# Patient Record
Sex: MALE | Race: WHITE | NOT HISPANIC OR LATINO | URBAN - METROPOLITAN AREA
[De-identification: names, ages, dates, MRNs, and addresses within clinical notes are randomized per-mention and may not be internally consistent; named-entity substitution may affect disease eponyms.]

---

## 2024-04-24 ENCOUNTER — OFFICE VISIT (OUTPATIENT)
Dept: PRIMARY CARE | Facility: CLINIC | Age: 49
End: 2024-04-24
Payer: COMMERCIAL

## 2024-04-24 ENCOUNTER — LAB (OUTPATIENT)
Dept: LAB | Facility: LAB | Age: 49
End: 2024-04-24
Payer: COMMERCIAL

## 2024-04-24 VITALS
WEIGHT: 252 LBS | HEIGHT: 71 IN | BODY MASS INDEX: 35.28 KG/M2 | OXYGEN SATURATION: 98 % | HEART RATE: 92 BPM | SYSTOLIC BLOOD PRESSURE: 128 MMHG | DIASTOLIC BLOOD PRESSURE: 82 MMHG

## 2024-04-24 DIAGNOSIS — Z00.00 HEALTHCARE MAINTENANCE: Primary | ICD-10-CM

## 2024-04-24 DIAGNOSIS — E66.9 OBESITY (BMI 30-39.9): ICD-10-CM

## 2024-04-24 DIAGNOSIS — G89.29 CHRONIC BILATERAL LOW BACK PAIN WITHOUT SCIATICA: ICD-10-CM

## 2024-04-24 DIAGNOSIS — Z00.00 HEALTHCARE MAINTENANCE: ICD-10-CM

## 2024-04-24 DIAGNOSIS — M54.50 CHRONIC BILATERAL LOW BACK PAIN WITHOUT SCIATICA: ICD-10-CM

## 2024-04-24 DIAGNOSIS — Z23 ENCOUNTER FOR IMMUNIZATION: ICD-10-CM

## 2024-04-24 DIAGNOSIS — Z23 NEED FOR TDAP VACCINATION: ICD-10-CM

## 2024-04-24 LAB
ALBUMIN SERPL BCP-MCNC: 4.3 G/DL (ref 3.4–5)
ALP SERPL-CCNC: 53 U/L (ref 33–120)
ALT SERPL W P-5'-P-CCNC: 27 U/L (ref 10–52)
ANION GAP SERPL CALC-SCNC: 11 MMOL/L (ref 10–20)
AST SERPL W P-5'-P-CCNC: 25 U/L (ref 9–39)
BASOPHILS # BLD AUTO: 0.05 X10*3/UL (ref 0–0.1)
BASOPHILS NFR BLD AUTO: 1 %
BILIRUB SERPL-MCNC: 0.5 MG/DL (ref 0–1.2)
BUN SERPL-MCNC: 25 MG/DL (ref 6–23)
CALCIUM SERPL-MCNC: 9.4 MG/DL (ref 8.6–10.3)
CHLORIDE SERPL-SCNC: 106 MMOL/L (ref 98–107)
CHOLEST SERPL-MCNC: 216 MG/DL (ref 0–199)
CHOLESTEROL/HDL RATIO: 3.3
CO2 SERPL-SCNC: 25 MMOL/L (ref 21–32)
CREAT SERPL-MCNC: 1 MG/DL (ref 0.5–1.3)
EGFRCR SERPLBLD CKD-EPI 2021: >90 ML/MIN/1.73M*2
EOSINOPHIL # BLD AUTO: 0.15 X10*3/UL (ref 0–0.7)
EOSINOPHIL NFR BLD AUTO: 3 %
ERYTHROCYTE [DISTWIDTH] IN BLOOD BY AUTOMATED COUNT: 13.8 % (ref 11.5–14.5)
GLUCOSE SERPL-MCNC: 95 MG/DL (ref 74–99)
HCT VFR BLD AUTO: 44.6 % (ref 41–52)
HDLC SERPL-MCNC: 65 MG/DL
HGB BLD-MCNC: 13.8 G/DL (ref 13.5–17.5)
IMM GRANULOCYTES # BLD AUTO: 0.01 X10*3/UL (ref 0–0.7)
IMM GRANULOCYTES NFR BLD AUTO: 0.2 % (ref 0–0.9)
LDLC SERPL CALC-MCNC: 123 MG/DL
LYMPHOCYTES # BLD AUTO: 1.71 X10*3/UL (ref 1.2–4.8)
LYMPHOCYTES NFR BLD AUTO: 34.6 %
MCH RBC QN AUTO: 27.2 PG (ref 26–34)
MCHC RBC AUTO-ENTMCNC: 30.9 G/DL (ref 32–36)
MCV RBC AUTO: 88 FL (ref 80–100)
MONOCYTES # BLD AUTO: 0.49 X10*3/UL (ref 0.1–1)
MONOCYTES NFR BLD AUTO: 9.9 %
NEUTROPHILS # BLD AUTO: 2.53 X10*3/UL (ref 1.2–7.7)
NEUTROPHILS NFR BLD AUTO: 51.3 %
NON HDL CHOLESTEROL: 151 MG/DL (ref 0–149)
NRBC BLD-RTO: 0 /100 WBCS (ref 0–0)
PLATELET # BLD AUTO: 250 X10*3/UL (ref 150–450)
POC APPEARANCE, URINE: CLEAR
POC BILIRUBIN, URINE: NEGATIVE
POC BLOOD, URINE: NEGATIVE
POC COLOR, URINE: NORMAL
POC GLUCOSE, URINE: NEGATIVE MG/DL
POC KETONES, URINE: NEGATIVE MG/DL
POC LEUKOCYTES, URINE: NEGATIVE
POC NITRITE,URINE: NEGATIVE
POC PH, URINE: 6 PH
POC PROTEIN, URINE: NEGATIVE MG/DL
POC SPECIFIC GRAVITY, URINE: 1.02
POC UROBILINOGEN, URINE: 0.2 EU/DL
POTASSIUM SERPL-SCNC: 4.4 MMOL/L (ref 3.5–5.3)
PROT SERPL-MCNC: 7 G/DL (ref 6.4–8.2)
PSA SERPL-MCNC: 0.31 NG/ML
RBC # BLD AUTO: 5.08 X10*6/UL (ref 4.5–5.9)
SODIUM SERPL-SCNC: 138 MMOL/L (ref 136–145)
TRIGL SERPL-MCNC: 142 MG/DL (ref 0–149)
TSH SERPL-ACNC: 2.43 MIU/L (ref 0.44–3.98)
VLDL: 28 MG/DL (ref 0–40)
WBC # BLD AUTO: 4.9 X10*3/UL (ref 4.4–11.3)

## 2024-04-24 PROCEDURE — 80053 COMPREHEN METABOLIC PANEL: CPT

## 2024-04-24 PROCEDURE — 85025 COMPLETE CBC W/AUTO DIFF WBC: CPT

## 2024-04-24 PROCEDURE — 84443 ASSAY THYROID STIM HORMONE: CPT

## 2024-04-24 PROCEDURE — 99203 OFFICE O/P NEW LOW 30 MIN: CPT | Performed by: STUDENT IN AN ORGANIZED HEALTH CARE EDUCATION/TRAINING PROGRAM

## 2024-04-24 PROCEDURE — 90715 TDAP VACCINE 7 YRS/> IM: CPT | Performed by: STUDENT IN AN ORGANIZED HEALTH CARE EDUCATION/TRAINING PROGRAM

## 2024-04-24 PROCEDURE — 83036 HEMOGLOBIN GLYCOSYLATED A1C: CPT

## 2024-04-24 PROCEDURE — 36415 COLL VENOUS BLD VENIPUNCTURE: CPT

## 2024-04-24 PROCEDURE — 90471 IMMUNIZATION ADMIN: CPT | Performed by: STUDENT IN AN ORGANIZED HEALTH CARE EDUCATION/TRAINING PROGRAM

## 2024-04-24 PROCEDURE — 99386 PREV VISIT NEW AGE 40-64: CPT | Performed by: STUDENT IN AN ORGANIZED HEALTH CARE EDUCATION/TRAINING PROGRAM

## 2024-04-24 PROCEDURE — 1036F TOBACCO NON-USER: CPT | Performed by: STUDENT IN AN ORGANIZED HEALTH CARE EDUCATION/TRAINING PROGRAM

## 2024-04-24 PROCEDURE — 80061 LIPID PANEL: CPT

## 2024-04-24 PROCEDURE — 84153 ASSAY OF PSA TOTAL: CPT

## 2024-04-24 PROCEDURE — 93000 ELECTROCARDIOGRAM COMPLETE: CPT | Performed by: STUDENT IN AN ORGANIZED HEALTH CARE EDUCATION/TRAINING PROGRAM

## 2024-04-24 PROCEDURE — 81002 URINALYSIS NONAUTO W/O SCOPE: CPT | Performed by: STUDENT IN AN ORGANIZED HEALTH CARE EDUCATION/TRAINING PROGRAM

## 2024-04-24 ASSESSMENT — PATIENT HEALTH QUESTIONNAIRE - PHQ9
2. FEELING DOWN, DEPRESSED OR HOPELESS: NOT AT ALL
SUM OF ALL RESPONSES TO PHQ9 QUESTIONS 1 AND 2: 0
1. LITTLE INTEREST OR PLEASURE IN DOING THINGS: NOT AT ALL

## 2024-04-24 NOTE — PROGRESS NOTES
Subjective   Patient ID: Tc Landa is a 48 y.o. male who presents for Annual Exam.  Today he is accompanied by alone.     HPI  1. Health maintenance  Overall patient is doing well.  Denies any chest pain, shortness of breath or wheezing upon exertion.  Denies any nausea/vomiting or diarrhea/constipation.  Denies any urinary symptoms.  Denies any recent changes in vision or hearing.  Denies any numbness or tingling.  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 Chronic back pain  Has a chronic past medical history of back pain in which he is following up with a pain management specialist  Does radiotherapy ablation of the nerves in the lumbar area every 12-15 months with significant improvement  Overall feels stable and continues to follow-up with a specialist  Also uses advil on an as-needed basis    3.  Need for Tdap vaccine  Patient is willing to receive his Tdap vaccine today    4. Obesity  Patient admits to struggling with weight loss although he has increased level of physical activity alongside counting calorie intake.  He is willing to try GLP-1 medication for weight loss      No current outpatient medications on file prior to visit.     No current facility-administered medications on file prior to visit.        No Known Allergies    Immunization History   Administered Date(s) Administered    Pfizer Purple Cap SARS-CoV-2 04/14/2021, 05/05/2021    Rabies, Unspecified 10/14/2011, 10/17/2011         Review of Systems  All pertinent positive  "symptoms are included in the history of present illness.  All other systems have been reviewed and are negative and noncontributory to this patient's current ailments.     Objective   /82 (BP Location: Left arm, Patient Position: Sitting, BP Cuff Size: Large adult)   Pulse 92   Ht 1.803 m (5' 11\")   Wt 114 kg (252 lb)   SpO2 98%   BMI 35.15 kg/m²   BSA: 2.39 meters squared      Physical Exam  CONSTITUTIONAL - well nourished, well developed, looks like stated age, in no acute distress  SKIN - normal skin color and pigmentation, normal skin turgor without rash, lesions, or nodules visualized  HEAD - no trauma, normocephalic  EYES - pupils are equal and reactive to light, extraocular muscles are intact, and normal external exam  ENT - uvula midline, normal tongue movement, throat without erythema or exudate, nasal passage without discharge and patent  NECK - supple without rigidity, no neck mass was observed, no thyromegaly   CHEST - clear to auscultation, no wheezing, no crackles and no rales, good effort  CARDIAC - regular rhythm and rate, no murmurs, normal S1 and S2  ABDOMEN - no organomegaly, soft, nontender, nondistended, normal bowel sounds, no guarding/rebound/rigidity  EXTREMITIES - no edema, no deformities  NEUROLOGICAL - normal gait, normal balance, normal motor, no ataxia, alert and oriented x3   PSYCHIATRIC - alert, pleasant and cordial, age-appropriate  IMMUNOLOGIC - no cervical lymphadenopathy     Assessment/Plan   Diagnoses and all orders for this visit:  Healthcare maintenance  Complete history and physical examination was performed  EKG reveals normal sinus rhythm without acute changes  Requisition for CBC, CMP, lipid panel, TSH, PSA, A1c, and UA were provided today  We will notify of test results once available and make treatment recommendations accordingly   Educated about the importance of conducting a healthy lifestyle, following a low calorie diet and exercise regularly, alongside with " controlling alcohol intake    Chronic bilateral low back pain without sciatica  Continue to follow with pain management  No further recommendations this time    Encounter for immunization  Patient will receive Tdap today in office.  We discussed the benefits and side effects of the immunization  All questions were answered and the vaccine was administered.     Obesity  Discussed ways to facilitate his weight loss, emphasizing the importance of low calorie diet and dedicated exercises.  Discussed about GLP-1, benefits of this class of medication and possible side effects.  All questions were answered, and patient expressed understanding of the medical situation.  A prescription of Zepbound will be sent to his pharmacy after we receive and evaluate the lab work as stated above.      Thank you for letting us be a part of your care team.  Please call the office if you have further questions or concerns regarding your care    Otherwise, please follow-up in 12 months for continued care and your yearly physical examination    Sammie Gracia MD  [id number] FM Resident

## 2024-04-25 LAB
EST. AVERAGE GLUCOSE BLD GHB EST-MCNC: 117 MG/DL
HBA1C MFR BLD: 5.7 %

## 2024-04-25 NOTE — RESULT ENCOUNTER NOTE
Cholesterol noted to be elevated at 216, HDL 65, , triglycerides 142    Sugar, kidneys, liver, electrolytes are all within normal limits other than BUN being slightly elevated but I am not concerned    Complete blood cell count shows no anemia    Thyroid within normal limits alongside a normal prostate    We just waiting for the hemoglobin A1c at this time

## 2024-04-25 NOTE — RESULT ENCOUNTER NOTE
Hemoglobin A1c shows prediabetes at 5.7%    I would recommend that the patient if you would like to try to use the Zepbound and notify the office when refills are needed and we will stay in close contact and have him follow-up in 3 months if he is able to do the medication for this time.

## 2024-04-26 ENCOUNTER — TELEPHONE (OUTPATIENT)
Dept: PRIMARY CARE | Facility: CLINIC | Age: 49
End: 2024-04-26
Payer: COMMERCIAL

## 2024-04-26 NOTE — TELEPHONE ENCOUNTER
Pts insurance plan does not cover Zepbound at all, they would not even let me submit a PA. Alternative suggestions?

## 2024-05-13 DIAGNOSIS — E66.9 OBESITY (BMI 30-39.9): ICD-10-CM

## 2024-06-07 DIAGNOSIS — E66.9 OBESITY (BMI 30-39.9): ICD-10-CM

## 2024-06-10 DIAGNOSIS — E66.9 OBESITY (BMI 30-39.9): ICD-10-CM

## 2024-07-08 DIAGNOSIS — Z00.00 HEALTHCARE MAINTENANCE: ICD-10-CM

## 2024-07-10 ENCOUNTER — OFFICE VISIT (OUTPATIENT)
Dept: PRIMARY CARE | Facility: CLINIC | Age: 49
End: 2024-07-10
Payer: COMMERCIAL

## 2024-07-10 ENCOUNTER — LAB (OUTPATIENT)
Dept: LAB | Facility: LAB | Age: 49
End: 2024-07-10
Payer: COMMERCIAL

## 2024-07-10 VITALS
SYSTOLIC BLOOD PRESSURE: 120 MMHG | DIASTOLIC BLOOD PRESSURE: 76 MMHG | BODY MASS INDEX: 33.61 KG/M2 | WEIGHT: 241 LBS | HEART RATE: 90 BPM | OXYGEN SATURATION: 96 %

## 2024-07-10 DIAGNOSIS — Z00.00 HEALTHCARE MAINTENANCE: ICD-10-CM

## 2024-07-10 DIAGNOSIS — E66.9 OBESITY (BMI 30-39.9): ICD-10-CM

## 2024-07-10 LAB
ALBUMIN SERPL BCP-MCNC: 4.4 G/DL (ref 3.4–5)
ALP SERPL-CCNC: 47 U/L (ref 33–120)
ALT SERPL W P-5'-P-CCNC: 13 U/L (ref 10–52)
ANION GAP SERPL CALC-SCNC: 13 MMOL/L (ref 10–20)
AST SERPL W P-5'-P-CCNC: 17 U/L (ref 9–39)
BILIRUB SERPL-MCNC: 0.5 MG/DL (ref 0–1.2)
BUN SERPL-MCNC: 28 MG/DL (ref 6–23)
CALCIUM SERPL-MCNC: 9.7 MG/DL (ref 8.6–10.3)
CHLORIDE SERPL-SCNC: 108 MMOL/L (ref 98–107)
CHOLEST SERPL-MCNC: 219 MG/DL (ref 0–199)
CHOLESTEROL/HDL RATIO: 3.5
CO2 SERPL-SCNC: 23 MMOL/L (ref 21–32)
CREAT SERPL-MCNC: 1.12 MG/DL (ref 0.5–1.3)
EGFRCR SERPLBLD CKD-EPI 2021: 81 ML/MIN/1.73M*2
GLUCOSE SERPL-MCNC: 65 MG/DL (ref 74–99)
HDLC SERPL-MCNC: 62.4 MG/DL
LDLC SERPL CALC-MCNC: 134 MG/DL
NON HDL CHOLESTEROL: 157 MG/DL (ref 0–149)
POTASSIUM SERPL-SCNC: 4.3 MMOL/L (ref 3.5–5.3)
PROT SERPL-MCNC: 7.3 G/DL (ref 6.4–8.2)
SODIUM SERPL-SCNC: 140 MMOL/L (ref 136–145)
TRIGL SERPL-MCNC: 113 MG/DL (ref 0–149)
VLDL: 23 MG/DL (ref 0–40)

## 2024-07-10 PROCEDURE — 80061 LIPID PANEL: CPT

## 2024-07-10 PROCEDURE — 99214 OFFICE O/P EST MOD 30 MIN: CPT | Performed by: STUDENT IN AN ORGANIZED HEALTH CARE EDUCATION/TRAINING PROGRAM

## 2024-07-10 PROCEDURE — 1036F TOBACCO NON-USER: CPT | Performed by: STUDENT IN AN ORGANIZED HEALTH CARE EDUCATION/TRAINING PROGRAM

## 2024-07-10 PROCEDURE — 83036 HEMOGLOBIN GLYCOSYLATED A1C: CPT

## 2024-07-10 PROCEDURE — 80053 COMPREHEN METABOLIC PANEL: CPT

## 2024-07-10 PROCEDURE — 36415 COLL VENOUS BLD VENIPUNCTURE: CPT

## 2024-07-10 ASSESSMENT — PATIENT HEALTH QUESTIONNAIRE - PHQ9
1. LITTLE INTEREST OR PLEASURE IN DOING THINGS: NOT AT ALL
SUM OF ALL RESPONSES TO PHQ9 QUESTIONS 1 AND 2: 0
2. FEELING DOWN, DEPRESSED OR HOPELESS: NOT AT ALL

## 2024-07-10 NOTE — PROGRESS NOTES
Subjective   Patient ID: Tc Landa is a 48 y.o. male who presents for Weight Loss.    HPI   Patient is presenting to the office today to follow-up for continued advice and recommendations relating to his elevated BMI and weight loss    After his last visit in April of this past year he was placed on Zepbound and has slowly increased dosages over these past few months    He is currently now at 7.5 mg and denies any major signs/symptoms    When he started on week 0 he was approximately 251 pounds and weighed himself just last week and he was at 234 pounds    He did have lab work done just this morning including a hemoglobin A1c, lipid panel, and a CMP which is still pending in the chart    He is coming to the office today to discuss this further and asking for advice/recommendations on continued medication use as well as weight loss advice    Review of Systems  All pertinent positive symptoms are included in the history of present illness.    All other systems have been reviewed and are negative and noncontributory to this patient's current ailments.    Objective   /76 (BP Location: Left arm, Patient Position: Sitting, BP Cuff Size: Large adult)   Pulse 90   Wt 109 kg (241 lb)   SpO2 96%   BMI 33.61 kg/m²     Physical Exam  CONSTITUTIONAL - well nourished, well developed, looks like stated age, in no acute distress, not ill-appearing, and not tired appearing  SKIN - normal skin color and pigmentation, normal skin turgor without rash, lesions, or nodules visualized  HEAD - no trauma, normocephalic  EYES - normal external exam  CHEST -no distressed breathing, good effort  EXTREMITIES - no edema, no deformities  NEUROLOGICAL - normal balance, normal motor, no ataxia  PSYCHIATRIC - alert, pleasant and cordial, age-appropriate    Assessment/Plan   We had a long conversation about all of your signs/symptoms and your progress and I am happy to hear that you are doing well    Was decided together that we will  increase you again now up to 10 mg weekly at this time    I provided a 3-month prescription for this medication as I recommend that you stay on the 10 mg dosage for at least 2 months    We will follow-up in October of this year and please send us a message between now and then if we need to do any adjustments from afar    Otherwise, we will wait for your lab work by tomorrow morning and will notify the results and make recommendations accordingly    Congratulations on the continued weight loss and we will follow-up in October stated above

## 2024-07-11 LAB
EST. AVERAGE GLUCOSE BLD GHB EST-MCNC: 114 MG/DL
HBA1C MFR BLD: 5.6 %

## 2024-07-11 NOTE — RESULT ENCOUNTER NOTE
Sugar is low when fasting at 65 but I am not concerned    Otherwise liver, kidneys, electrolytes are all within normal limits and/or stable    Cholesterol continues to be elevated at 219, HDL 62, , triglycerides 113  This is overall stable    We are just waiting for the hemoglobin A1c at this time

## 2024-07-11 NOTE — RESULT ENCOUNTER NOTE
Hemoglobin A1c noted improvement now within normal limits at 5.6%    Keep up the great work we will follow-up in 3 months as discussed at your appointment

## 2024-09-05 DIAGNOSIS — E66.9 OBESITY (BMI 30-39.9): Primary | ICD-10-CM

## 2024-10-30 DIAGNOSIS — E66.9 OBESITY (BMI 30-39.9): ICD-10-CM

## 2024-11-06 ENCOUNTER — LAB (OUTPATIENT)
Dept: LAB | Facility: LAB | Age: 49
End: 2024-11-06
Payer: COMMERCIAL

## 2024-11-06 DIAGNOSIS — E66.9 OBESITY (BMI 30-39.9): ICD-10-CM

## 2024-11-06 LAB
ALBUMIN SERPL BCP-MCNC: 4.2 G/DL (ref 3.4–5)
ALP SERPL-CCNC: 40 U/L (ref 33–120)
ALT SERPL W P-5'-P-CCNC: 12 U/L (ref 10–52)
ANION GAP SERPL CALC-SCNC: 10 MMOL/L (ref 10–20)
AST SERPL W P-5'-P-CCNC: 14 U/L (ref 9–39)
BILIRUB SERPL-MCNC: 0.5 MG/DL (ref 0–1.2)
BUN SERPL-MCNC: 23 MG/DL (ref 6–23)
CALCIUM SERPL-MCNC: 9 MG/DL (ref 8.6–10.3)
CHLORIDE SERPL-SCNC: 107 MMOL/L (ref 98–107)
CHOLEST SERPL-MCNC: 180 MG/DL (ref 0–199)
CHOLESTEROL/HDL RATIO: 3.3
CO2 SERPL-SCNC: 26 MMOL/L (ref 21–32)
CREAT SERPL-MCNC: 0.86 MG/DL (ref 0.5–1.3)
EGFRCR SERPLBLD CKD-EPI 2021: >90 ML/MIN/1.73M*2
GLUCOSE SERPL-MCNC: 82 MG/DL (ref 74–99)
HDLC SERPL-MCNC: 54.5 MG/DL
LDLC SERPL CALC-MCNC: 105 MG/DL
NON HDL CHOLESTEROL: 126 MG/DL (ref 0–149)
POTASSIUM SERPL-SCNC: 4.3 MMOL/L (ref 3.5–5.3)
PROT SERPL-MCNC: 6.6 G/DL (ref 6.4–8.2)
SODIUM SERPL-SCNC: 139 MMOL/L (ref 136–145)
TRIGL SERPL-MCNC: 102 MG/DL (ref 0–149)
VLDL: 20 MG/DL (ref 0–40)

## 2024-11-06 PROCEDURE — 80061 LIPID PANEL: CPT

## 2024-11-06 PROCEDURE — 83036 HEMOGLOBIN GLYCOSYLATED A1C: CPT

## 2024-11-06 PROCEDURE — 36415 COLL VENOUS BLD VENIPUNCTURE: CPT

## 2024-11-06 PROCEDURE — 80053 COMPREHEN METABOLIC PANEL: CPT

## 2024-11-06 NOTE — RESULT ENCOUNTER NOTE
Cholesterol noted to be the best on file of 180, HDL 54, , triglycerides 102    Sugar, kidneys, liver, electrolytes are all within normal limits    We are just waiting for the hemoglobin A1c at this time

## 2024-11-07 LAB
EST. AVERAGE GLUCOSE BLD GHB EST-MCNC: 111 MG/DL
HBA1C MFR BLD: 5.5 %

## 2024-11-07 NOTE — RESULT ENCOUNTER NOTE
Hemoglobin A1c the best on file at 5.5%    Will discuss this at the patient's follow-up appointment next week

## 2024-11-12 ENCOUNTER — APPOINTMENT (OUTPATIENT)
Dept: PRIMARY CARE | Facility: CLINIC | Age: 49
End: 2024-11-12
Payer: COMMERCIAL

## 2024-11-12 VITALS
DIASTOLIC BLOOD PRESSURE: 82 MMHG | HEART RATE: 66 BPM | WEIGHT: 232 LBS | BODY MASS INDEX: 32.48 KG/M2 | SYSTOLIC BLOOD PRESSURE: 122 MMHG | HEIGHT: 71 IN

## 2024-11-12 DIAGNOSIS — E66.9 OBESITY (BMI 30-39.9): ICD-10-CM

## 2024-11-12 PROCEDURE — 99214 OFFICE O/P EST MOD 30 MIN: CPT | Performed by: STUDENT IN AN ORGANIZED HEALTH CARE EDUCATION/TRAINING PROGRAM

## 2024-11-12 PROCEDURE — 3008F BODY MASS INDEX DOCD: CPT | Performed by: STUDENT IN AN ORGANIZED HEALTH CARE EDUCATION/TRAINING PROGRAM

## 2024-11-12 PROCEDURE — 1036F TOBACCO NON-USER: CPT | Performed by: STUDENT IN AN ORGANIZED HEALTH CARE EDUCATION/TRAINING PROGRAM

## 2024-11-12 ASSESSMENT — PATIENT HEALTH QUESTIONNAIRE - PHQ9
SUM OF ALL RESPONSES TO PHQ9 QUESTIONS 1 AND 2: 0
2. FEELING DOWN, DEPRESSED OR HOPELESS: NOT AT ALL
1. LITTLE INTEREST OR PLEASURE IN DOING THINGS: NOT AT ALL

## 2024-11-12 NOTE — PROGRESS NOTES
"Subjective   Patient ID: Tc Landa is a 48 y.o. male who presents for Weight Loss (Check up since being on zepbound).    Past Medical, Surgical, and Family History reviewed and updated in chart.    Reviewed all medications by prescribing practitioner or clinical pharmacist (such as prescriptions, OTCs, herbal therapies and supplements) and documented in the medical record.    HPI  1.  Weight Loss  Currently on Zepbound 12.5 mg  Denies any major signs/symptoms  Does endorse heartburn which is managed with famotidine and tums  He has lost a total of 25 pounds so far  Lab work done on November 6 2024 sows HbA1c best on file at 5.5%, Cholesterol noted to be the best on file of 180, HDL 54, , triglycerides 102 and Sugar, kidneys, liver, electrolytes are all within normal limits    Interested in further discussions on medication management      Review of Systems  All pertinent positive symptoms are included in the history of present illness.    All other systems have been reviewed and are negative and noncontributory to this patient's current ailments.    Past Medical History:   Diagnosis Date    Other conditions influencing health status 01/31/2022    No significant past medical history     History reviewed. No pertinent surgical history.  Social History     Tobacco Use    Smoking status: Never    Smokeless tobacco: Never     No family history on file.  Immunization History   Administered Date(s) Administered    Pfizer Purple Cap SARS-CoV-2 04/14/2021, 05/05/2021    Rabies, Unspecified 10/14/2011, 10/17/2011    Tdap vaccine, age 7 year and older (BOOSTRIX, ADACEL) 04/24/2024     Current Outpatient Medications   Medication Instructions    tirzepatide (weight loss) (ZEPBOUND) 15 mg, subcutaneous, Every 7 days     No Known Allergies    Objective   Vitals:    11/12/24 0941   BP: 122/82   Pulse: 66   Weight: 105 kg (232 lb)   Height: 1.803 m (5' 11\")     Body mass index is 32.36 kg/m².    BP Readings from Last 3 " Encounters:   11/12/24 122/82   07/10/24 120/76   04/24/24 128/82      Wt Readings from Last 3 Encounters:   11/12/24 105 kg (232 lb)   07/10/24 109 kg (241 lb)   04/24/24 114 kg (252 lb)        Lab on 11/06/2024   Component Date Value    Cholesterol 11/06/2024 180     HDL-Cholesterol 11/06/2024 54.5     Cholesterol/HDL Ratio 11/06/2024 3.3     LDL Calculated 11/06/2024 105 (H)     VLDL 11/06/2024 20     Triglycerides 11/06/2024 102     Non HDL Cholesterol 11/06/2024 126     Glucose 11/06/2024 82     Sodium 11/06/2024 139     Potassium 11/06/2024 4.3     Chloride 11/06/2024 107     Bicarbonate 11/06/2024 26     Anion Gap 11/06/2024 10     Urea Nitrogen 11/06/2024 23     Creatinine 11/06/2024 0.86     eGFR 11/06/2024 >90     Calcium 11/06/2024 9.0     Albumin 11/06/2024 4.2     Alkaline Phosphatase 11/06/2024 40     Total Protein 11/06/2024 6.6     AST 11/06/2024 14     Bilirubin, Total 11/06/2024 0.5     ALT 11/06/2024 12     Hemoglobin A1C 11/06/2024 5.5     Estimated Average Glucose 11/06/2024 111      Physical Exam  CONSTITUTIONAL - well nourished, well developed, looks like stated age, in no acute distress, not ill-appearing, and not tired appearing  SKIN - normal skin color and pigmentation, normal skin turgor without rash, lesions, or nodules visualized  HEAD - no trauma, normocephalic  EYES - normal external exam  NECK - supple without rigidity, no neck mass was observed  CHEST - clear to auscultation, no wheezing, no crackles and no rales, good effort  CARDIAC - regular rate and regular rhythm, no skipped beats, no murmur  ABDOMEN - no organomegaly, soft, nontender, nondistended, normal bowel sounds, no guarding/rebound/rigidity  EXTREMITIES - no obvious or evident edema, no obvious or evident deformities  NEUROLOGICAL - normal gait, normal balance, normal motor, no ataxia; alert, oriented and no focal signs  PSYCHIATRIC - alert, pleasant and cordial, age-appropriate    Assessment/Plan   Problem List Items  Addressed This Visit    None  Visit Diagnoses       Obesity (BMI 30-39.9)        Relevant Medications    tirzepatide, weight loss, (Zepbound) 15 mg/0.5 mL injection          Weight loss  Congratulations on your weight loss so far!  Your labs look fantastic and I am very pleased at how you are progressing thus far  Increase to Zepbound 15 mg as discussed at your visit today    Follow up in 3-6 months for continued care and management    Around your next visit we will discuss your yearly physical examination

## 2025-03-19 DIAGNOSIS — E66.9 OBESITY (BMI 30-39.9): ICD-10-CM

## 2025-03-24 DIAGNOSIS — E66.9 OBESITY (BMI 30-39.9): ICD-10-CM

## 2025-04-17 DIAGNOSIS — Z00.00 HEALTHCARE MAINTENANCE: Primary | ICD-10-CM

## 2025-04-17 DIAGNOSIS — Z13.6 SCREENING FOR HEART DISEASE: ICD-10-CM

## 2025-04-17 DIAGNOSIS — Z13.1 SCREENING FOR DIABETES MELLITUS: ICD-10-CM

## 2025-04-17 DIAGNOSIS — E66.9 OBESITY (BMI 30-39.9): ICD-10-CM

## 2025-04-18 LAB
ALBUMIN SERPL-MCNC: 4.3 G/DL (ref 3.6–5.1)
ALP SERPL-CCNC: 51 U/L (ref 35–144)
ALT SERPL-CCNC: 10 U/L (ref 9–46)
ANION GAP SERPL CALCULATED.4IONS-SCNC: 8 MMOL/L (CALC) (ref 7–17)
AST SERPL-CCNC: 13 U/L (ref 10–35)
BILIRUB SERPL-MCNC: 0.4 MG/DL (ref 0.2–1.2)
BUN SERPL-MCNC: 23 MG/DL (ref 7–25)
CALCIUM SERPL-MCNC: 9.1 MG/DL (ref 8.6–10.3)
CHLORIDE SERPL-SCNC: 107 MMOL/L (ref 98–110)
CHOLEST SERPL-MCNC: 196 MG/DL
CHOLEST/HDLC SERPL: 3.3 (CALC)
CO2 SERPL-SCNC: 26 MMOL/L (ref 20–32)
CREAT SERPL-MCNC: 0.79 MG/DL (ref 0.7–1.3)
EGFRCR SERPLBLD CKD-EPI 2021: 108 ML/MIN/1.73M2
EST. AVERAGE GLUCOSE BLD GHB EST-MCNC: 105 MG/DL
EST. AVERAGE GLUCOSE BLD GHB EST-SCNC: 5.8 MMOL/L
GLUCOSE SERPL-MCNC: 85 MG/DL (ref 65–99)
HBA1C MFR BLD: 5.3 %
HDLC SERPL-MCNC: 59 MG/DL
LDLC SERPL CALC-MCNC: 118 MG/DL (CALC)
NONHDLC SERPL-MCNC: 137 MG/DL (CALC)
POTASSIUM SERPL-SCNC: 4.2 MMOL/L (ref 3.5–5.3)
PROT SERPL-MCNC: 6.7 G/DL (ref 6.1–8.1)
SODIUM SERPL-SCNC: 141 MMOL/L (ref 135–146)
TRIGL SERPL-MCNC: 87 MG/DL

## 2025-04-20 NOTE — RESULT ENCOUNTER NOTE
Cholesterol looks good at 196, HDL 59, triglycerides 87,     Sugar, kidneys, liver, electrolytes are all within normal limits    Hemoglobin A1c looks great at 5.3%    Will discuss this further at the patient's appointment this next coming week

## 2025-04-21 NOTE — PROGRESS NOTES
Subjective   Patient ID: Tc Landa is a 49 y.o. male who presents for Weight Loss.         Reviewed all medications by prescribing practitioner or clinical pharmacist (such as prescriptions, OTCs, herbal therapies and supplements) and documented in the medical record.    HPI    1. Health Maintenance  Overall patient is doing well.   Denies any nausea/vomiting or diarrhea/constipation.  Denies any urinary symptoms.  Denies any recent changes in vision or hearing.  Denies any numbness or tingling.  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 Chronic back pain  Has a chronic past medical history of back pain in which he is following up with a pain management specialist  Does radiotherapy ablation of the nerves in the lumbar area every 12-15 months with significant improvement  Overall feels stable and continues to follow-up with a specialist  Also uses advil on an as-needed basis    3.  Weight Loss  Currently on Zepbound 15 mg  Denies any major signs/symptoms  Does endorse heartburn which is managed with famotidine and tums  He has lost a total of 25 pounds so far   Lab work done on April 2025 sows HbA1c best on file at 5.3%,   Cholesterol at 196, HDL 59, , triglycerides 87 and Sugar, kidneys, liver, electrolytes are all within normal limits  Patient is very active in the gym, has 4-7 sessions a week,   Patient did have to stop his medications for 1 month due to cost reasons   During this month he did gain some of his weight back  Hx of sleep apnea in the past, improved after  these injections  Has never been formally diagnosed with sleep apnea and has never had a sleep study  Interested in further discussions on medication management    Wt Readings from Last 4 Encounters:   04/22/25 106 kg (232 lb 12.8 oz)   11/12/24 105 kg (232 lb)   07/10/24 109 kg (241 lb)   04/24/24 114 kg (252 lb)         Review of Systems  All pertinent positive symptoms are included in the history of present illness.    All other systems have been reviewed and are negative and noncontributory to this patient's current ailments.    Past Medical History:   Diagnosis Date    Other conditions influencing health status 01/31/2022    No significant past medical history     No past surgical history on file.  Social History     Tobacco Use    Smoking status: Never    Smokeless tobacco: Never     No family history on file.  Immunization History   Administered Date(s) Administered    COVID-19, mRNA, LNP-S, PF, 30 mcg/0.3 mL dose 04/14/2021, 05/05/2021    Rabies, Unspecified 10/14/2011, 10/17/2011    Tdap vaccine, age 7 year and older (BOOSTRIX, ADACEL) 04/24/2024     Current Outpatient Medications   Medication Instructions    tirzepatide (weight loss) (ZEPBOUND) 15 mg, subcutaneous, Every 7 days     No Known Allergies    Objective   Vitals:    04/22/25 0954   BP: 130/86   BP Location: Left arm   Patient Position: Sitting   BP Cuff Size: Large adult   Pulse: 101   SpO2: 95%   Weight: 106 kg (232 lb 12.8 oz)       Body mass index is 32.47 kg/m².    BP Readings from Last 3 Encounters:   04/22/25 130/86   11/12/24 122/82   07/10/24 120/76      Wt Readings from Last 3 Encounters:   04/22/25 106 kg (232 lb 12.8 oz)   11/12/24 105 kg (232 lb)   07/10/24 109 kg (241 lb)        Orders Only on 04/17/2025   Component Date Value    CHOLESTEROL, TOTAL 04/17/2025 196     HDL CHOLESTEROL 04/17/2025 59     TRIGLYCERIDES 04/17/2025 87     LDL-CHOLESTEROL 04/17/2025 118 (H)     CHOL/HDLC RATIO 04/17/2025 3.3     NON HDL CHOLESTEROL  04/17/2025 137 (H)     GLUCOSE 04/17/2025 85     UREA NITROGEN (BUN) 04/17/2025 23     CREATININE 04/17/2025 0.79     EGFR 04/17/2025 108     SODIUM 04/17/2025 141     POTASSIUM 04/17/2025 4.2     CHLORIDE 04/17/2025 107     CARBON DIOXIDE 04/17/2025 26     ELECTROLYTE BALANCE 04/17/2025 8     CALCIUM 04/17/2025 9.1     PROTEIN, TOTAL 04/17/2025 6.7     ALBUMIN 04/17/2025 4.3     BILIRUBIN, TOTAL 04/17/2025 0.4     ALKALINE PHOSPHATASE 04/17/2025 51     AST 04/17/2025 13     ALT 04/17/2025 10     HEMOGLOBIN A1c 04/17/2025 5.3     eAG (mg/dL) 04/17/2025 105     eAG (mmol/L) 04/17/2025 5.8      Physical Exam  CONSTITUTIONAL - well nourished, well developed, looks like stated age, in no acute distress, not ill-appearing, and not tired appearing  SKIN - normal skin color and pigmentation, normal skin turgor without rash, lesions, or nodules visualized  HEAD - no trauma, normocephalic  EYES - normal external exam  NECK - supple without rigidity, no neck mass was observed  CHEST - clear to auscultation, no wheezing, no crackles and no rales, good effort  CARDIAC - regular rate and regular rhythm, no skipped beats, no murmur  ABDOMEN - no organomegaly, soft, nontender, nondistended, normal bowel sounds, no guarding/rebound/rigidity  EXTREMITIES - no obvious or evident edema, no obvious or evident deformities  NEUROLOGICAL - normal gait, normal balance, normal motor, no ataxia; alert, oriented and no focal signs  PSYCHIATRIC - alert, pleasant and cordial, age-appropriate    Assessment/Plan   Problem List Items Addressed This Visit    None  Visit Diagnoses         Healthcare maintenance    -  Primary      Obesity (BMI 30-39.9)          Chronic bilateral low back pain without sciatica          Colon cancer screening              1. Health Maintenance  Overall patient is doing well.    Complete history and physical examination was performed   Lab work was ordered and will notify of test results and make recommendations  accordingly  Colon Cancer screening due, colonoscopy ordered   Shingrix will be done at your next visit:   Please attempt to eat a well-balanced diet and exercise regularly     2. Chronic back pain  Continue to follow with pain management  No further recommendations this time    3. Weight loss  Due to the platau of your weight loss we discussed changing your medications today.   Shared medical decision making was discussed today and we will send a prescription for Wegovy 1 mg weekly injections to your pharmacy.   Please let us know if you would like to pursue a sleep study in the future.    Follow up in 3 months for continued care and management and labwork

## 2025-04-22 ENCOUNTER — APPOINTMENT (OUTPATIENT)
Dept: PRIMARY CARE | Facility: CLINIC | Age: 50
End: 2025-04-22
Payer: COMMERCIAL

## 2025-04-22 VITALS
OXYGEN SATURATION: 95 % | BODY MASS INDEX: 32.47 KG/M2 | DIASTOLIC BLOOD PRESSURE: 86 MMHG | HEART RATE: 101 BPM | SYSTOLIC BLOOD PRESSURE: 130 MMHG | WEIGHT: 232.8 LBS

## 2025-04-22 DIAGNOSIS — G89.29 CHRONIC BILATERAL LOW BACK PAIN WITHOUT SCIATICA: ICD-10-CM

## 2025-04-22 DIAGNOSIS — M54.50 CHRONIC BILATERAL LOW BACK PAIN WITHOUT SCIATICA: ICD-10-CM

## 2025-04-22 DIAGNOSIS — Z00.00 HEALTHCARE MAINTENANCE: Primary | ICD-10-CM

## 2025-04-22 DIAGNOSIS — Z12.11 COLON CANCER SCREENING: ICD-10-CM

## 2025-04-22 DIAGNOSIS — E66.9 OBESITY (BMI 30-39.9): ICD-10-CM

## 2025-04-22 PROCEDURE — 99396 PREV VISIT EST AGE 40-64: CPT | Performed by: STUDENT IN AN ORGANIZED HEALTH CARE EDUCATION/TRAINING PROGRAM

## 2025-04-22 PROCEDURE — 1036F TOBACCO NON-USER: CPT | Performed by: STUDENT IN AN ORGANIZED HEALTH CARE EDUCATION/TRAINING PROGRAM

## 2025-04-22 PROCEDURE — 99213 OFFICE O/P EST LOW 20 MIN: CPT | Performed by: STUDENT IN AN ORGANIZED HEALTH CARE EDUCATION/TRAINING PROGRAM

## 2025-04-22 RX ORDER — SEMAGLUTIDE 1 MG/.5ML
1 INJECTION, SOLUTION SUBCUTANEOUS WEEKLY
Qty: 2 ML | Refills: 0 | Status: SHIPPED | OUTPATIENT
Start: 2025-04-22 | End: 2025-05-21

## 2025-05-19 DIAGNOSIS — E66.9 OBESITY (BMI 30-39.9): ICD-10-CM

## 2025-05-19 RX ORDER — SEMAGLUTIDE 1.7 MG/.75ML
1.7 INJECTION, SOLUTION SUBCUTANEOUS WEEKLY
Qty: 3 ML | Refills: 0 | Status: SHIPPED | OUTPATIENT
Start: 2025-05-19 | End: 2025-06-20

## 2025-06-18 DIAGNOSIS — E66.9 OBESITY (BMI 30-39.9): ICD-10-CM

## 2025-06-18 RX ORDER — SEMAGLUTIDE 2.4 MG/.75ML
2.4 INJECTION, SOLUTION SUBCUTANEOUS WEEKLY
Qty: 3 ML | Refills: 1 | Status: SHIPPED | OUTPATIENT
Start: 2025-06-18

## 2025-07-16 DIAGNOSIS — E66.9 OBESITY (BMI 30-39.9): ICD-10-CM

## 2025-07-16 RX ORDER — SEMAGLUTIDE 2.4 MG/.75ML
2.4 INJECTION, SOLUTION SUBCUTANEOUS WEEKLY
Qty: 3 ML | Refills: 1 | Status: SHIPPED | OUTPATIENT
Start: 2025-07-16